# Patient Record
Sex: MALE | Race: WHITE | NOT HISPANIC OR LATINO | URBAN - METROPOLITAN AREA
[De-identification: names, ages, dates, MRNs, and addresses within clinical notes are randomized per-mention and may not be internally consistent; named-entity substitution may affect disease eponyms.]

---

## 2023-03-17 ENCOUNTER — OFFICE VISIT (OUTPATIENT)
Dept: URGENT CARE | Facility: CLINIC | Age: 23
End: 2023-03-17

## 2023-03-17 VITALS
WEIGHT: 155 LBS | DIASTOLIC BLOOD PRESSURE: 65 MMHG | SYSTOLIC BLOOD PRESSURE: 115 MMHG | OXYGEN SATURATION: 97 % | HEIGHT: 70 IN | TEMPERATURE: 99 F | BODY MASS INDEX: 22.19 KG/M2 | RESPIRATION RATE: 18 BRPM | HEART RATE: 78 BPM

## 2023-03-17 DIAGNOSIS — J02.9 SORE THROAT: ICD-10-CM

## 2023-03-17 DIAGNOSIS — J02.0 STREP THROAT: Primary | ICD-10-CM

## 2023-03-17 LAB
CTP QC/QA: YES
MOLECULAR STREP A: POSITIVE

## 2023-03-17 PROCEDURE — 87651 STREP A DNA AMP PROBE: CPT | Mod: QW,S$GLB,, | Performed by: NURSE PRACTITIONER

## 2023-03-17 PROCEDURE — 99214 OFFICE O/P EST MOD 30 MIN: CPT | Mod: S$GLB,,, | Performed by: NURSE PRACTITIONER

## 2023-03-17 PROCEDURE — 99214 PR OFFICE/OUTPT VISIT, EST, LEVL IV, 30-39 MIN: ICD-10-PCS | Mod: S$GLB,,, | Performed by: NURSE PRACTITIONER

## 2023-03-17 PROCEDURE — 87651 POCT STREP A MOLECULAR: ICD-10-PCS | Mod: QW,S$GLB,, | Performed by: NURSE PRACTITIONER

## 2023-03-17 RX ORDER — AMOXICILLIN 500 MG/1
500 TABLET, FILM COATED ORAL EVERY 12 HOURS
Qty: 20 TABLET | Refills: 0 | Status: SHIPPED | OUTPATIENT
Start: 2023-03-17 | End: 2023-03-27

## 2023-03-17 NOTE — PROGRESS NOTES
"Subjective:       Patient ID: Tonny Hilton is a 22 y.o. male.    Vitals:  height is 5' 10" (1.778 m) and weight is 70.3 kg (155 lb). His temperature is 98.9 °F (37.2 °C). His blood pressure is 115/65 and his pulse is 78. His respiration is 18 and oxygen saturation is 97%.     Chief Complaint: Sinus Problem      Patient is a 22-year-old male who presents to urgent care for evaluation of sore throat.  Associated symptoms include low-grade fever, nausea, vomiting, cough and abdominal pain. Symptoms started 2 days ago.     Other  This is a new problem. The current episode started in the past 7 days (onset tuesday). The problem occurs constantly. The problem has been unchanged. Associated symptoms include abdominal pain, a change in bowel habit, congestion, coughing, fatigue, a fever, headaches, myalgias, nausea, a rash, a sore throat, swollen glands and vomiting. Pertinent negatives include no anorexia, arthralgias, chest pain, chills, diaphoresis, joint swelling, neck pain, numbness, urinary symptoms, vertigo, visual change or weakness. The symptoms are aggravated by coughing, swallowing and drinking. Treatments tried: ibu, zpack, mucinex dm. The treatment provided mild relief.     Constitution: Positive for fatigue and fever. Negative for chills and sweating.   HENT:  Positive for congestion and sore throat.    Neck: Negative for neck pain.   Cardiovascular:  Negative for chest pain.   Respiratory:  Positive for cough.    Gastrointestinal:  Positive for abdominal pain, nausea and vomiting.   Musculoskeletal:  Positive for muscle ache. Negative for joint pain and joint swelling.   Skin:  Positive for rash.   Neurological:  Positive for headaches. Negative for history of vertigo and numbness.     Objective:      Physical Exam   Constitutional: He is oriented to person, place, and time. He appears well-developed. He is cooperative.  Non-toxic appearance. He does not appear ill. No distress.   HENT:   Head: " Normocephalic and atraumatic.   Ears:   Right Ear: Hearing, tympanic membrane, external ear and ear canal normal. Tympanic membrane is not erythematous. No middle ear effusion.   Left Ear: Hearing, tympanic membrane, external ear and ear canal normal. Tympanic membrane is not erythematous.  No middle ear effusion.   Nose: Nose normal. No mucosal edema, rhinorrhea or nasal deformity. No epistaxis. Right sinus exhibits no maxillary sinus tenderness and no frontal sinus tenderness. Left sinus exhibits no maxillary sinus tenderness and no frontal sinus tenderness.   Mouth/Throat: Uvula is midline and mucous membranes are normal. No trismus in the jaw. Normal dentition. No uvula swelling. Posterior oropharyngeal edema and posterior oropharyngeal erythema present. No oropharyngeal exudate, tonsillar abscesses or cobblestoning. Tonsils are 2+ on the right. Tonsils are 2+ on the left. Tonsillar exudate.   Eyes: Conjunctivae and lids are normal. No scleral icterus.   Neck: Trachea normal and phonation normal. Neck supple. No edema present. No erythema present. No neck rigidity present.   Cardiovascular: Normal rate, regular rhythm, normal heart sounds and normal pulses.   Pulmonary/Chest: Effort normal and breath sounds normal. No respiratory distress. He has no decreased breath sounds. He has no rhonchi.   Abdominal: Normal appearance.   Musculoskeletal: Normal range of motion.         General: No deformity. Normal range of motion.   Neurological: He is alert and oriented to person, place, and time. He exhibits normal muscle tone. Coordination normal.   Skin: Skin is warm, dry, intact, not diaphoretic and not pale.   Psychiatric: His speech is normal and behavior is normal. Judgment and thought content normal.   Nursing note and vitals reviewed.      Assessment:       1. Strep throat    2. Sore throat          Plan:         Strep throat  -     amoxicillin (AMOXIL) 500 MG Tab; Take 1 tablet (500 mg total) by mouth every 12  (twelve) hours. for 10 days  Dispense: 20 tablet; Refill: 0    Sore throat  -     POCT Strep A, Molecular                 Results for orders placed or performed in visit on 03/17/23   POCT Strep A, Molecular   Result Value Ref Range    Molecular Strep A, POC Positive (A) Negative     Acceptable Yes                                            Strep Throat   Swelling at the back of your throat is called pharyngitis. Swelling of your throat and tonsils is tonsillopharyngitis. Both are commonly called a sore throat. Your sore throat is likely caused by a virus. Most of the time, a sore throat will go away without antibiotics in a week or two.    What care is needed at home?   Ask your doctor what you need to do when you go home. Make sure you ask questions if you do not understand what the doctor says. This way you will know what you need to do.  To help ease a sore throat you can:  Use a sore throat spray.  Suck on hard candy or throat lozenges.  Gargle with warm saltwater a few times each day. Mix of 1/4 teaspoon (1.25 grams) salt in 8 ounces (240 mL) of warm water.  Use a cool mist humidifier to help you breathe easier.  You may want to take medicine like acetaminophen, ibuprofen, or naproxen for pain.  If you decide to take over-the-counter cough or cold medicines, follow the directions on the label carefully. Be sure you do not take more than one medicine that contains acetaminophen. Also, if you have a heart problem or high blood pressure, check with your doctor before you take any of these medicines.  Wash your hands often. This will help keep others healthy.

## 2023-03-17 NOTE — LETTER
March 17, 2023      [facility]  [address] [place] [address] [place] [address]  Phone: [phone]  Fax: [phone]       Patient: Tonny Hilton   YOB: 2000  Date of Visit: 03/17/2023    To Whom It May Concern:    Maddie Hilton  was at [facility] on 03/17/2023. The patient may return to work/school on 3/20/2023 with no restrictions. If you have any questions or concerns, or if I can be of further assistance, please do not hesitate to contact me.    Sincerely,    Aleksandr Dewey NP

## 2024-01-21 ENCOUNTER — OFFICE VISIT (OUTPATIENT)
Dept: URGENT CARE | Facility: CLINIC | Age: 24
End: 2024-01-21

## 2024-01-21 VITALS
RESPIRATION RATE: 16 BRPM | OXYGEN SATURATION: 95 % | HEART RATE: 79 BPM | SYSTOLIC BLOOD PRESSURE: 124 MMHG | DIASTOLIC BLOOD PRESSURE: 60 MMHG | TEMPERATURE: 99 F | WEIGHT: 155 LBS | BODY MASS INDEX: 22.19 KG/M2 | HEIGHT: 70 IN

## 2024-01-21 DIAGNOSIS — J02.9 SORE THROAT: Primary | ICD-10-CM

## 2024-01-21 DIAGNOSIS — J06.9 VIRAL URI: ICD-10-CM

## 2024-01-21 LAB
CTP QC/QA: YES
MOLECULAR STREP A: NEGATIVE

## 2024-01-21 PROCEDURE — 99203 OFFICE O/P NEW LOW 30 MIN: CPT | Mod: TIER,S$GLB,, | Performed by: NURSE PRACTITIONER

## 2024-01-21 PROCEDURE — 87651 STREP A DNA AMP PROBE: CPT | Mod: QW,TIER,S$GLB, | Performed by: NURSE PRACTITIONER

## 2024-01-21 NOTE — LETTER
January 21, 2024      [facility] [address] [place] [address]  Phone: [phone]  Fax: [phone]       Patient: Tonny Hilton   YOB: 2000  Date of Visit: 01/21/2024    To Whom It May Concern:    Maddie Hilton  was at [facility] on 01/21/2024. The patient may return to work/school on 01/22/2024 with no restrictions. If you have any questions or concerns, or if I can be of further assistance, please do not hesitate to contact me.    Sincerely,    Suzanna Gold RT

## 2024-01-21 NOTE — PATIENT INSTRUCTIONS
Patient Instructions   Increase fluids   Rest activity ad livia   Tylenol 650 mg every 4-6 hrs as needed for fever headache body aches   Advil 200 mg 2-3 tabs every 6 hrs as needed for fever, headache body aches---Must take with food   Supportive care measures   Warm salt water gargles; lozenges   Viral infections usually resolve in 7-10 days; If symptoms persist or worsen follow up UC or PCP

## 2024-01-21 NOTE — PROGRESS NOTES
"Subjective:      Patient ID: Tonny Hilton is a 23 y.o. male.    Vitals:  height is 5' 10" (1.778 m) and weight is 70.3 kg (155 lb). His tympanic temperature is 98.9 °F (37.2 °C). His blood pressure is 124/60 and his pulse is 79. His respiration is 16 and oxygen saturation is 95%.     Chief Complaint: Sinus Problem    Patient presents with congestion and swollen tonsils. States he had a fever this morning(100.6). Symptoms started yesterday.     Sinus Problem  This is a new problem. The current episode started yesterday. The problem has been gradually improving since onset. The maximum temperature recorded prior to his arrival was 100.4 - 100.9 F. The fever has been present for Less than 1 day. His pain is at a severity of 4/10. The pain is mild. Associated symptoms include congestion, headaches, shortness of breath, sinus pressure and a sore throat. Pertinent negatives include no chills, coughing, diaphoresis, ear pain, hoarse voice, neck pain, sneezing or swollen glands. Treatments tried: ibuprofen. The treatment provided mild relief.       Constitution: Negative for chills and sweating.   HENT:  Positive for congestion, sinus pressure and sore throat. Negative for ear pain.    Neck: Negative for neck pain.   Respiratory:  Positive for shortness of breath. Negative for cough.    Allergic/Immunologic: Negative for sneezing.   Neurological:  Positive for headaches.      Objective:     Vitals:    01/21/24 1155   BP: 124/60   Pulse: 79   Resp: 16   Temp: 98.9 °F (37.2 °C)   TempSrc: Tympanic   SpO2: 95%   Weight: 70.3 kg (155 lb)   Height: 5' 10" (1.778 m)       Physical Exam   Constitutional: He is oriented to person, place, and time. He appears well-developed. He is cooperative.  Non-toxic appearance. He does not appear ill. No distress. obesity  HENT:   Head: Normocephalic and atraumatic.   Ears:   Right Ear: Hearing, external ear and ear canal normal. A middle ear effusion is present.   Left Ear: Hearing, external " ear and ear canal normal. A middle ear effusion is present.   Nose: Congestion present. No mucosal edema or nasal deformity. No epistaxis. Right sinus exhibits no maxillary sinus tenderness and no frontal sinus tenderness. Left sinus exhibits no maxillary sinus tenderness and no frontal sinus tenderness.   Mouth/Throat: Uvula is midline, oropharynx is clear and moist and mucous membranes are normal. Mucous membranes are moist. No trismus in the jaw. Normal dentition. No uvula swelling. Cobblestoning present. No oropharyngeal exudate, posterior oropharyngeal edema or posterior oropharyngeal erythema.   Eyes: Conjunctivae and lids are normal. Pupils are equal, round, and reactive to light. No scleral icterus. Extraocular movement intact   Neck: Trachea normal and phonation normal. Neck supple. No edema present. No erythema present. No neck rigidity present.   Cardiovascular: Normal rate, regular rhythm, normal heart sounds and normal pulses.   Pulmonary/Chest: Effort normal and breath sounds normal. No respiratory distress. He has no decreased breath sounds. He has no rhonchi.   Abdominal: Normal appearance.   Musculoskeletal: Normal range of motion.         General: No deformity. Normal range of motion.   Neurological: He is alert and oriented to person, place, and time. He exhibits normal muscle tone. Coordination normal.   Skin: Skin is warm, dry, intact, not diaphoretic and not pale.   Psychiatric: His speech is normal and behavior is normal. Judgment and thought content normal.   Nursing note and vitals reviewed.      Assessment:     1. Sore throat    2. Viral URI      Results for orders placed or performed in visit on 01/21/24   POCT Strep A, Molecular   Result Value Ref Range    Molecular Strep A, POC Negative Negative     Acceptable Yes        Plan:     Patient stable for discharge and home management of condition   Sore throat  -     POCT Strep A, Molecular    Viral URI      Patient  Instructions     Patient Instructions   Increase fluids   Rest activity ad livia   Tylenol 650 mg every 4-6 hrs as needed for fever headache body aches   Advil 200 mg 2-3 tabs every 6 hrs as needed for fever, headache body aches---Must take with food   Supportive care measures   Warm salt water gargles; lozenges   Viral infections usually resolve in 7-10 days; If symptoms persist or worsen follow up UC or PCP

## 2024-01-21 NOTE — LETTER
January 21, 2024      [facility] [address] [place] [address]  Phone: [phone]  Fax: [phone]       Patient: Tonny Hilton   YOB: 2000  Date of Visit: 01/21/2024    To Whom It May Concern:    Maddie Hilton  was at [facility] on 01/21/2024. The patient may return to work/school on *** {With/no:42878} restrictions. If you have any questions or concerns, or if I can be of further assistance, please do not hesitate to contact me.    Sincerely,    Imtiaz Tobias NP